# Patient Record
Sex: MALE
[De-identification: names, ages, dates, MRNs, and addresses within clinical notes are randomized per-mention and may not be internally consistent; named-entity substitution may affect disease eponyms.]

---

## 2018-03-12 ENCOUNTER — HOSPITAL ENCOUNTER (EMERGENCY)
Dept: HOSPITAL 31 - C.ER | Age: 28
Discharge: HOME | End: 2018-03-12
Payer: MEDICAID

## 2018-03-12 VITALS
HEART RATE: 70 BPM | OXYGEN SATURATION: 97 % | RESPIRATION RATE: 20 BRPM | TEMPERATURE: 98.1 F | DIASTOLIC BLOOD PRESSURE: 85 MMHG | SYSTOLIC BLOOD PRESSURE: 142 MMHG

## 2018-03-12 DIAGNOSIS — Z76.5: ICD-10-CM

## 2018-03-12 DIAGNOSIS — M54.5: ICD-10-CM

## 2018-03-12 DIAGNOSIS — G89.29: Primary | ICD-10-CM

## 2018-03-12 NOTE — C.PDOC
History Of Present Illness





Pt with chronic back pain , s/p spinal fusion in 2016 presents to ER c/o of low 

back pain x 3 days. pt also reports slipped 1 week ago but no direct trauma to 

back. Pt denies lower extremity weakness, numbness, bladder or bowel 

incontinence, or urinary sx. Pt reports taking yesterday a narcotic pill from a 

relative


Time Seen by Provider: 03/12/18 01:54


Chief Complaint (Nursing): Back Pain


History Per: Patient


History/Exam Limitations: no limitations


Onset/Duration Of Symptoms: Intermittent Episodes


Current Symptoms Are (Timing): Still Present


Severity: Moderate


Previous Symptoms: Chronic Pain, Prior Surgery


Associated Symptoms: None


Exacerbating Factor(s): Nothing





Past Medical History


Vital Signs: 





 Last Vital Signs











Temp  98.1 F   03/12/18 01:47


 


Pulse  70   03/12/18 01:47


 


Resp  20   03/12/18 01:47


 


BP  142/85   03/12/18 01:47


 


Pulse Ox  97   03/12/18 01:47














- Medical History


PMH: Chronic Pain (Back)


Family History: States: Unknown Family Hx





- Social History


Hx Alcohol Use: No


Hx Substance Use: No





Review Of Systems


Constitutional: Negative for: Fever


Gastrointestinal: Negative for: Abdominal Pain


Genitourinary: Negative for: Dysuria, Hematuria


Musculoskeletal: Positive for: Back Pain


Neurological: Negative for: Weakness, Numbness





Physical Exam





- Physical Exam


Appears: Well, No Acute Distress


Skin: Normal Color


Head: Atraumatic


Eye(s): bilateral: Normal Inspection


Respiratory: Normal Breath Sounds


Gastrointestinal/Abdominal: Normal Exam, Soft, No Tenderness


Back: Normal Inspection, No CVA Tenderness, No Vertebral Tenderness, Paraspinal 

Tenderness (lumbar b/l), No Straight Leg Raising


Extremity: Normal ROM, No Tenderness, No Calf Tenderness, No Swelling


Extremity: Bilateral: Atraumatic


Neurological/Psych: Oriented x3, Normal Motor, Normal Sensation


Gait: Steady





ED Course And Treatment


O2 Sat by Pulse Oximetry: 97


Pulse Ox Interpretation: Normal


Progress Note: Pt is requesting narcs, pain management protocol and state law d/

w pt who refused toradol IM. Pt then advised PMD follow up or clinic follow up. 

Pt is ambulatory with steady gait





Disposition


Counseled Patient/Family Regarding: Diagnosis, Need For Followup, Rx Given





- Disposition


Referrals: 


[facility] [Outside]


Disposition: HOME/ ROUTINE


Disposition Time: 02:15


Condition: STABLE


Additional Instructions: 


Take OTC motrin or aleve for pain





Return to ER if worse 


Instructions:  Chronic Pain (DC)





- Clinical Impression


Clinical Impression: 


 Chronic back pain, Drug-seeking behavior